# Patient Record
(demographics unavailable — no encounter records)

---

## 2019-03-04 NOTE — RADIOLOGY REPORT (SQ)
EXAM DESCRIPTION:  U/S ABDOMEN LIMITED W/O DOP



COMPLETED DATE/TIME:  3/4/2019 9:37 am



REASON FOR STUDY:  SOFT TISSUE MASS OF ABD WALL



COMPARISON:  None.



TECHNIQUE:  Dynamic and static grayscale images acquired of the localized site of clinical concern an
d recorded on PACS. Additional selected color Doppler and spectral images recorded.

SITE OF CONCERN: Right abdominal wall



LIMITATIONS:  None.



FINDINGS:  The patient has a clinically palpable aspect of the right upper quadrant abdominal wall.. 
No evidence of soft tissue mass or fluid collection by ultrasound examination.



IMPRESSION:  1. NO SOFT TISSUE MASS, FLUID COLLECTION as above.  Correlation suggested.



TECHNICAL DOCUMENTATION:  JOB ID:  [id number]

 2011 [employer]- All Rights Reserved



Reading location - IP/workstation name: [email or web address]

## 2019-08-08 NOTE — RADIOLOGY REPORT (SQ)
EXAM DESCRIPTION:  CHEST 2 VIEWS



COMPLETED DATE/TIME:  8/8/2019 3:44 pm



REASON FOR STUDY:  CHRONIC COUGH



COMPARISON:  1/12/2016



TECHNIQUE:  Frontal and lateral radiographic views of the chest acquired.



NUMBER OF VIEWS:  Two view.



LIMITATIONS:  None.



FINDINGS:  LUNGS AND PLEURA: No pneumothorax. No consolidation or pleural effusion.

MEDIASTINUM AND HILAR STRUCTURES: Stable.

HEART AND VASCULAR STRUCTURES: Stable.

BONES: No acute findings.

HARDWARE: None in the chest.

OTHER: No other significant finding.



IMPRESSION:  NO ACUTE FINDINGS.



TECHNICAL DOCUMENTATION:  JOB ID:  [id number]

[id number]

 2011 [employer]- All Rights Reserved



Reading location - IP/workstation name: [email or web address]

## 2020-01-13 NOTE — ER DOCUMENT REPORT
ED Respiratory Problem





- General


Chief Complaint: Shortness Of Breath


Stated Complaint: CHEST PAIN,SHORTNESS OF BREATH


Time Seen by Provider: 20 04:40


Primary Care Provider: 


KACIE ROY FNP-C [Primary Care Provider] - Follow up in 3-5 days


Mode of Arrival: Ambulatory


Information source: Patient


Notes: 





53-year-old female presented to ED for complaint of upper left back pain with 

shortness of breath that started yesterday and became worse overnight.  She 

states that she has been coughing with relatively for the last 2 weeks.  She has

a history of asthma pneumonia pleurisy.  She states for the last 2 weeks she has

been coughing so much and then in the morning she coughs and coughs until she 

gags and throws up.


TRAVEL OUTSIDE OF THE [place] IN LAST 30 DAYS: No





- HPI


Patient complains to provider of: Cough, Short of breath, Other - Pain to the 

left upper back she states in the lung area


Onset: Other - 2 weeks


Duration: Continuous


Initiating Event: URI


Quality of pain: Sharp


Severity: Moderate


Pain Level: 4


Context: Hx asthma, Smoker


Short of Breath: Mild


Cough: Productive


Sputum amount: Copious


Sputum color: White


Sputum consistency: Thick


At home treatment: Bronchodilators


Associated symptoms: Congestion, Cough, PND, Runny nose, Sinus pain/pressure, 

Other - Pain to the left upper back


Worsened by: Cough


Similar symptoms previously: Yes


Recently seen / treated by doctor: No





- Related Data


Allergies/Adverse Reactions: 


                                        





adhesive tape [Adhesive Tape] Allergy (Verified 16 19:35)


   








Home Medications: HTN MED UNSURE OF NAME.  ALBUTEROL.  SIMBACORT





Past Medical History





- General


Information source: Patient





- Social History


Smoking Status: Current Every Day Smoker


Cigarette use (# per day): Yes


Frequency of alcohol use: None


Drug Abuse: None


Lives with: Family


Family History: Reviewed & Not Pertinent


Patient has suicidal ideation: No


Patient has homicidal ideation: No





- Past Medical History


Cardiac Medical History: Reports: Hx Hypertension


Pulmonary Medical History: Reports: Hx Asthma


EENT Medical History: Reports: None


Neurological Medical History: Reports: None


Endocrine Medical History: Reports: None


Renal/ Medical History: Reports: None


Malignancy Medical History: Reports: None


GI Medical History: Reports: None


Musculoskeletal Medical History: Reports None


Skin Medical History: Reports None


Psychiatric Medical History: Reports: None


Traumatic Medical History: Reports: None


Infectious Medical History: Reports: None


Surgical Hx: Negative


Past Surgical History: Reports: None, Hx  Section





- Immunizations


Hx Diphtheria, Pertussis, Tetanus Vaccination: Yes





Review of Systems





- Review of Systems


Constitutional: No symptoms reported


EENT: No symptoms reported


Cardiovascular: No symptoms reported


Respiratory: Cough, Short of breath


Gastrointestinal: No symptoms reported


Genitourinary: No symptoms reported


Female Genitourinary: No symptoms reported


Musculoskeletal: Other - Left upper back with pain


Skin: No symptoms reported


Hematologic/Lymphatic: No symptoms reported


Neurological/Psychological: No symptoms reported


-: Yes All other systems reviewed and negative





Physical Exam





- Vital signs


Vitals: 


                                        











Pulse Ox


 


 97 


 


 20 03:45











Interpretation: Normal





- General


General appearance: Appears well, Alert





- HEENT


Head: Normocephalic, Atraumatic


Eyes: Normal


Pupils: PERRL


Ears: Normal


External canal: Normal


Tympanic membrane: Normal


Sinus: Normal


Nasal: Purulent discharge, Swelling


Mouth/Lips: Normal


Mucous membranes: Normal


Pharynx: Normal


Neck: Normal





- Respiratory


Respiratory status: No respiratory distress


Chest status: Tender, Pain with cough, Pain with deep breathing


Breath sounds: Normal


Chest palpation: Normal





- Cardiovascular


Rhythm: Regular


Heart sounds: Normal auscultation


Murmur: No





- Abdominal


Inspection: Normal


Distension: No distension


Bowel sounds: Normal


Tenderness: Nontender


Organomegaly: No organomegaly





- Back


Back: Normal, Tender - left upper posterior rib tenderness,





- Extremities


General upper extremity: Normal inspection, Nontender, Normal color, Normal ROM,

Normal temperature


General lower extremity: Normal inspection, Nontender, Normal color, Normal ROM,

Normal temperature, Normal weight bearing.  No: Devonte's sign





- Neurological


Neuro grossly intact: Yes


Cognition: Normal


Orientation: AAOx4


Rissa Coma Scale Eye Opening: Spontaneous


Rissa Coma Scale Verbal: Oriented


Rissa Coma Scale Motor: Obeys Commands


Rissa Coma Scale Total: 15


Speech: Normal


Motor strength normal: LUE, RUE, LLE, RLE


Sensory: Normal





- Psychological


Associated symptoms: Normal affect, Normal mood





- Skin


Skin Temperature: Warm


Skin Moisture: Dry


Skin Color: Normal





Course





- Re-evaluation


Re-evalutation: 





20 08:16


Discussed x-ray and labs with patient and written report of x-ray and labs given

to patient to follow-up with primary doctor.  Patient was treated with Toradol 

for costochondritis-like pain and instructed to follow-up with her primary care 

doctor.  Patient was given instructions on upper respiratory infection 

treatments for person with high blood pressure.  Patient verbalized 

understanding and agreement with treatment plan and patient was discharged home





- Vital Signs


Vital signs: 


                                        











Temp Pulse Resp BP Pulse Ox


 


 97.9 F   71   16   123/55 L  99 


 


 20 05:14  20 05:14  20 05:00  20 05:14  20 05:16














- Laboratory


Result Diagrams: 


                                 20 02:00





                                 20 02:00


Laboratory results interpreted by me: 


                                        











  20





  02:00 02:00 03:50


 


WBC  12.7 H  


 


RDW  14.4 H  


 


Absolute Neuts (auto)  8.9 H  


 


Potassium   3.1 L 


 


Carbon Dioxide   32 H 


 


Est GFR (MDRD) Non-Af   52 L 


 


Glucose   113 H 


 


Urine Blood    MODERATE H














- Diagnostic Test


Radiology reviewed: Image reviewed, Reports reviewed





Discharge





- Discharge


Clinical Impression: 


 Upper back pain on left side, Costochondritis, acute





URI (upper respiratory infection)


Qualifiers:


 URI type: unspecified viral URI Qualified Code(s): J06.9 - Acute upper 

respiratory infection, unspecified





Condition: Stable


Disposition: HOME, SELF-CARE


Additional Instructions: 


Costochondritis





     Your chest pain is coming from the rib cartilages in the chest wall. This 

is often caused by subtle straining of the ribs near the breastbone. The strain 

can occur from a mild injury, coughing or sneezing with a "cold," vigorous 

vomiting, or even from rib compression while sleeping. Often the pain doesn't 

begin until a couple of days after the strain.


     Persons with arthritis are especially prone to this type of pain, due to 

inflammation of the cartilage joints near the breast bone. But often, there is 

no clear reason why it happens.


     Rest from strenuous physical activity.  This kind of chest pain is usually 

made worse by movement of the chest.  Depending on the symptoms, we may 

prescribe medicine for pain and inflammation. Apply gentle warmth to the painful

area for 15 minutes every hour or two.


     You should call contact the doctor immediately if things change. Further 

evaluation is needed if you develop a fever or cough, if the nature of the pain 

changes, or if you become short of breath.





UPPER RESPIRATORY ILLNESS:





     You have a viral infection of the respiratory passages -- a "cold."  This 

common infection causes nasal congestion, drainage, and often sore throat and 

cough.  It is highly contagious.  The disease usually lasts about 10 to 14 days.


     There is no "cure" for the viral infection -- it must run its course.  If 

there is a complication, such as bacterial infection in the nose, sinuses, 

middle ear, or bronchial tubes, antibiotics may be required.  The antibiotics 

won't affect the virus.


     Drink plenty of fluids.  A humidifier may help.  An expectorant medication 

or decongestant may make you more comfortable.  Use acetaminophen or ibuprofen 

for fever or aches.


     See the doctor if fever persists over two days, if there is any significant

worsening of your symptoms, or if you simply fail to improve as expected.





You use Coricidin HP for your cough and cold symptoms   you could also use 

Flonase which is over-the-counter 1 spray each nostril twice a day.  You could 

also use salt soda solution gargles.  These will help to remove the drainage 

from the back your throat.  





Salt and soda solution gargle





1 quart of water


1 tablespoon of salt


1 teaspoon of baking soda


Mixed 3 ingredients together and boil for 1 minute


Placed in a covered quart jar


Use 1/2 ounce of cold solution to gargle 3 times a day





COUGH-SUPPRESSANT & EXPECTORANT MEDICATION:


     You are to use a cough medication as needed for relief of symptoms.  This 

medicine is a combination of an expectorant (to make the mucous thinner and more

easily "coughed up") and a cough suppressant (to reduce the frequency of 

coughing).


     The cough-suppressant medicine is related to narcotics.  You may experience

mild nausea and sleepiness.  Some patients who are very sensitive to narcotics 

may have stomach pain from this medicine. Taking the medicine with food reduces 

these side effects.  Do not drive or work with machinery until you know how this

medicine affects you.


     The expectorant should have no side effects.  Iodine-containing 

expectorants (such as organidin) should not be taken by persons with active 

thyroid disease unless approved by your doctor.


     Call the doctor if you develop shortness of breath, hives, rash, itching, 

lightheadedness, or severe nausea and vomiting.








USE OF ACETAMINOPHEN (Tylenol):


     Acetaminophen may be taken for pain relief or fever control. It's much 

safer than aspirin, offering a wider range of "safe" dosages.  It is safe during

pregnancy.  Some brand names are Tylenol, Panadol, Datril, Anacin 3, Tempra, and

Liquiprin. Acetaminophen can be repeated every four hours.  The following are 

maximum recommended dosages:


>89 pounds or adults          650 mg to 900 mg


Acetaminophen can be repeated every four hours.  Maximum dose not to exceed 4000

mg a day.








SMOKING:


     If you smoke, you should stop smoking.  The tar and chemicals in cigarette 

smoke are harmful.  Smoking has been shown to cause:


          emphysema


          chronic bronchitis


          lung cancer


          mouth and throat cancer


          stomach and pancreas cancer


          premature aging


          birth defects


     In addition, smoking increases ear and lung infections in children of 

smokers.





Toradol Injection





     You have been given an injection of ketorolac tromethamine (Toradol).  This

is an excellent, safe drug for pain control.  It also has potent 

antiinflammatory action.  You should have significant pain relief within about 

one hour.


     Toradol is not addicting and is non-sedating.  It does not interfere with 

driving or work.


     Call or return if you develop itching, hives, shortness of breath, or rash.





FOLLOW-UP CARE:


If you have been referred to a physician for follow-up care, call the 

physicians office for an appointment as you were instructed or within the next 

two days.  If you experience worsening or a significant change in your symptoms,

notify the physician immediately or return to the Emergency Department at any 

time for re-evaluation.





Forms:  Smoking Cessation Education


Referrals: 


KACIE ROY FNP-C [Primary Care Provider] - Follow up in 3-5 days

## 2020-01-13 NOTE — RADIOLOGY REPORT (SQ)
EXAM DESCRIPTION: 



X-RAY CHEST ONE VIEW



CLINICAL HISTORY: 



53 years, Female, PAIN 



COMPARISON: 



None.



FINDINGS: 



PA chest at 0217 hours on 1/13/2020. The lungs are well expanded

and clear. The costophrenic angles are sharp. The cardiac

silhouette, hilar regions, trachea, soft tissues and bony

structures are unremarkable, aside from degenerative changes.



IMPRESSION: 



No acute cardiopulmonary disease.

## 2020-01-28 NOTE — RADIOLOGY REPORT (SQ)
EXAM DESCRIPTION:  T SPINE AP/LAT



COMPLETED DATE/TIME:  1/28/2020 3:07 pm



REASON FOR STUDY:  M54.6 PAIN IN THORACIC SPINE M54.6  PAIN IN THORACIC SPINE



COMPARISON:  None.



NUMBER OF VIEWS:  Two views.



TECHNIQUE:  AP and lateral radiographic images acquired of the thoracic spine.



LIMITATIONS:  None.



FINDINGS:  MINERALIZATION: Normal.

ALIGNMENT: Mild sigmoid scoliosis.

VERTEBRAE: No fracture or bone lesion.  Maintained height, normal segmentation.

DISCS: Multilevel disc space narrowing with osteophytes.

HARDWARE: None in the spine.

MEDIASTINUM AND SOFT TISSUES: Normal heart size and aortic contour.  No soft tissue abnormality.

VISUALIZED LUNG FIELDS: Clear.

OTHER: No other significant finding.



IMPRESSION:  SPONDYLOSIS WITHOUT BONE LESION OR FRACTURE.



TECHNICAL DOCUMENTATION:  JOB ID:  [id number]

 2011 [employer]- All Rights Reserved



Reading location - IP/workstation name: [email or web address]

## 2020-02-13 NOTE — RADIOLOGY REPORT (SQ)
EXAM DESCRIPTION:  MRI THORACIC SPINE WITHOUT



COMPLETED DATE/TIME:  2/13/2020 3:37 pm



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
DYLOSIS W/O MYELOPATHY OR RADICULOPATHY, THORACI



COMPARISON:  None.



TECHNIQUE:  Sagittal and Axial imaging includes T1, T2, STIR and gradient echo sequences.



LIMITATIONS:  None.



FINDINGS:  LOCALIZER: No worrisome findings.

ALIGNMENT: Normal.

VERTEBRAE: Intact.

BONE MARROW: Normal. No marrow replacement or reactive changes.

HARDWARE: None in the spine.

CORD: Normal in size and signal intensity.

SOFT TISSUES: No soft tissue masses.

THORACIC DISCS T1-T12: Small central disc protrusion at T9-T10, slightly asymmetric to the right.  Mi
ld impingement of the thoracic cord.  No significant spinal stenosis.  The remainder of the thoracic 
disc levels are unremarkable.

LOWER CERVICAL: Incompletely imaged. No significant spinal stenosis or exit foraminal stenosis.

UPPER LUMBAR: Incompletely imaged.  No significant spinal stenosis or exit foraminal stenosis.

OTHER: No other significant finding.



IMPRESSION:  SMALL DISC PROTRUSION AT T9-T10 WITH MILD IMPINGEMENT OF THE CORD.



TECHNICAL DOCUMENTATION:  JOB ID:  [id number]

 2011 [employer]- All Rights Reserved



Reading location - IP/workstation name: [email or web address]